# Patient Record
Sex: MALE | Race: OTHER | HISPANIC OR LATINO | Employment: FULL TIME | URBAN - METROPOLITAN AREA
[De-identification: names, ages, dates, MRNs, and addresses within clinical notes are randomized per-mention and may not be internally consistent; named-entity substitution may affect disease eponyms.]

---

## 2022-06-04 ENCOUNTER — APPOINTMENT (OUTPATIENT)
Dept: RADIOLOGY | Facility: MEDICAL CENTER | Age: 37
End: 2022-06-04
Attending: EMERGENCY MEDICINE

## 2022-06-04 ENCOUNTER — HOSPITAL ENCOUNTER (EMERGENCY)
Facility: MEDICAL CENTER | Age: 37
End: 2022-06-04
Attending: EMERGENCY MEDICINE
Payer: COMMERCIAL

## 2022-06-04 VITALS
HEART RATE: 78 BPM | DIASTOLIC BLOOD PRESSURE: 76 MMHG | WEIGHT: 169.53 LBS | BODY MASS INDEX: 25.69 KG/M2 | SYSTOLIC BLOOD PRESSURE: 134 MMHG | TEMPERATURE: 97.2 F | OXYGEN SATURATION: 98 % | HEIGHT: 68 IN | RESPIRATION RATE: 16 BRPM

## 2022-06-04 DIAGNOSIS — T14.8XXA PUNCTURE WOUND: ICD-10-CM

## 2022-06-04 DIAGNOSIS — S62.629B: ICD-10-CM

## 2022-06-04 PROCEDURE — 90471 IMMUNIZATION ADMIN: CPT

## 2022-06-04 PROCEDURE — 700102 HCHG RX REV CODE 250 W/ 637 OVERRIDE(OP): Performed by: EMERGENCY MEDICINE

## 2022-06-04 PROCEDURE — 90715 TDAP VACCINE 7 YRS/> IM: CPT | Performed by: EMERGENCY MEDICINE

## 2022-06-04 PROCEDURE — 700111 HCHG RX REV CODE 636 W/ 250 OVERRIDE (IP): Performed by: EMERGENCY MEDICINE

## 2022-06-04 PROCEDURE — A9270 NON-COVERED ITEM OR SERVICE: HCPCS | Performed by: EMERGENCY MEDICINE

## 2022-06-04 PROCEDURE — 73140 X-RAY EXAM OF FINGER(S): CPT | Mod: LT

## 2022-06-04 PROCEDURE — 99284 EMERGENCY DEPT VISIT MOD MDM: CPT

## 2022-06-04 RX ORDER — LIDOCAINE HYDROCHLORIDE 10 MG/ML
20 INJECTION, SOLUTION INFILTRATION; PERINEURAL ONCE
Status: DISCONTINUED | OUTPATIENT
Start: 2022-06-04 | End: 2022-06-04

## 2022-06-04 RX ORDER — CEPHALEXIN 500 MG/1
500 CAPSULE ORAL 4 TIMES DAILY
Qty: 40 CAPSULE | Refills: 0 | Status: SHIPPED | OUTPATIENT
Start: 2022-06-04 | End: 2022-06-11

## 2022-06-04 RX ORDER — CEPHALEXIN 500 MG/1
500 CAPSULE ORAL ONCE
Status: COMPLETED | OUTPATIENT
Start: 2022-06-04 | End: 2022-06-04

## 2022-06-04 RX ADMIN — CLOSTRIDIUM TETANI TOXOID ANTIGEN (FORMALDEHYDE INACTIVATED), CORYNEBACTERIUM DIPHTHERIAE TOXOID ANTIGEN (FORMALDEHYDE INACTIVATED), BORDETELLA PERTUSSIS TOXOID ANTIGEN (GLUTARALDEHYDE INACTIVATED), BORDETELLA PERTUSSIS FILAMENTOUS HEMAGGLUTININ ANTIGEN (FORMALDEHYDE INACTIVATED), BORDETELLA PERTUSSIS PERTACTIN ANTIGEN, AND BORDETELLA PERTUSSIS FIMBRIAE 2/3 ANTIGEN 0.5 ML: 5; 2; 2.5; 5; 3; 5 INJECTION, SUSPENSION INTRAMUSCULAR at 16:27

## 2022-06-04 RX ADMIN — CEPHALEXIN 500 MG: 500 CAPSULE ORAL at 16:26

## 2022-06-04 NOTE — ED NOTES
Finger splint applied to L second digit. Pt NV intact before and after placement.       Wound site cleaned with 200 ML of NS.

## 2022-06-04 NOTE — ED NOTES
Discharge instructions discussed with pt including follow up with ortho and Keflex prescription. Pt knows to return for any new/concerning symptoms including wound drainage, redness, fever, etc.

## 2022-06-04 NOTE — ED TRIAGE NOTES
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 CMS intact, safety glove in place from PTA. Tetanus not up to date.       Ambulatory to triage for above complaint.   Educated on triage process, encourage to inform staff of any changes.     BP (!) 150/79   Pulse 73   Temp 36.8 °C (98.2 °F) (Temporal)   Resp 14   Ht 1.727 m (5' 8\")   Wt 76.9 kg (169 lb 8.5 oz)   SpO2 98%   BMI 25.78 kg/m²     "

## 2022-06-04 NOTE — ED PROVIDER NOTES
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 CMS intact, safety glove in place from PTA. Tetanus not up to date.       HPI  Sen Dunn is a 37 y.o. male who presents to the Emergency Department from home where he was doing construction work, he drove a nail through his left pointer finger with a nail gun.  His tetanus was not up-to-date he denies any other acute symptoms at this time    REVIEW OF SYSTEMS  As above, all other systems negative.  PAST MEDICAL HISTORY     Denies  SOCIAL HISTORY  Lives locally here with wife  SURGICAL HISTORY  patient denies any surgical history    CURRENT MEDICATIONS  Reviewed.  See Encounter Summary.  Include none    ALLERGIES  No Known Allergies    PHYSICAL EXAM  VITAL SIGNS: BP (!) 150/79   Pulse 73   Temp 36.8 °C (98.2 °F) (Temporal)   Resp 14   Ht 1.727 m (5' 8\")   Wt 76.9 kg (169 lb 8.5 oz)   SpO2 98%   BMI 25.78 kg/m²   Constitutional: Pleasant, Alert in no apparent distress.  HENT: Normocephalic, Bilateral external ears normal. Nose normal.   Eyes: Pupils are equal and reactive. Conjunctiva normal, non-icteric.   Thorax & Lungs: Easy unlabored respirations  Abdomen:  No gross signs of peritonitis, no pain with movement   Skin: Visualized skin is  Dry, No erythema, No rash.   Extremities: Nail embedded through second middle phalanx  Neurologic: Alert, Grossly non-focal.   Psychiatric: Affect and Mood normal      COURSE & MEDICAL DECISION MAKING  Nursing notes and vital signs were reviewed. (See chart for details)    The patient presents to the Emergency Department with chief complaint of a nail embedded into his second index finger.  Patient had anesthesia with 1% lidocaine without epinephrine with good results.  Tetanus status was updated  DX-FINGER(S) 2+ LEFT   Final Result      Nail extending to the second " middle phalanx with displaced intra-articular fracture        Nail was removed after anesthesia as above, he was given Keflex and placed on Keflex prophylaxis a finger splint will be placed and I will follow-up with Ortho for ongoing management, Dr. Baum is on-call and he is referred been referred to call Dr. Baum on Monday      The patient was discharged home with an information sheet on puncture wound and finger fracture and told to return immediately for any signs or symptoms listed, or any unexpected symptoms.  The patient verbally agreed to the discharge precautions and follow-up plan which is documented in EPIC.  DISPOSITION:    Patient will be discharged home in stable condition.    FOLLOW UP:  [facility], Emergency Dept  [address] [facility] [address] [address] [address] [phone]    For wound re-check if any redness, pus or drianage    Aníbal Baum M.D.  [address] [facility] [address] [phone]      For wound re-check in one week if any pain      OUTPATIENT MEDICATIONS:  New Prescriptions    CEPHALEXIN (KEFLEX) 500 MG CAP    Take 1 Capsule by mouth 4 times a day for 7 days.         FINAL IMPRESSION   1. Puncture wound